# Patient Record
Sex: FEMALE | Race: OTHER
[De-identification: names, ages, dates, MRNs, and addresses within clinical notes are randomized per-mention and may not be internally consistent; named-entity substitution may affect disease eponyms.]

---

## 2018-01-18 ENCOUNTER — HOSPITAL ENCOUNTER (EMERGENCY)
Dept: HOSPITAL 74 - JER | Age: 63
Discharge: HOME | End: 2018-01-18
Payer: COMMERCIAL

## 2018-01-18 VITALS — DIASTOLIC BLOOD PRESSURE: 83 MMHG | TEMPERATURE: 98.5 F | SYSTOLIC BLOOD PRESSURE: 141 MMHG | HEART RATE: 63 BPM

## 2018-01-18 VITALS — BODY MASS INDEX: 25.8 KG/M2

## 2018-01-18 DIAGNOSIS — R51: ICD-10-CM

## 2018-01-18 DIAGNOSIS — I10: Primary | ICD-10-CM

## 2018-01-18 PROCEDURE — 3E0233Z INTRODUCTION OF ANTI-INFLAMMATORY INTO MUSCLE, PERCUTANEOUS APPROACH: ICD-10-PCS

## 2018-01-18 NOTE — PDOC
History of Present Illness





- General


Chief Complaint: Blood Pressure Problem


Stated Complaint: BLOOD PRESSURE


Time Seen by Provider: 01/18/18 14:28


History Source: Patient


Exam Limitations: No Limitations





- History of Present Illness


Initial Comments: 





CHIEF COMPLAINT:  63 y/o afebrile female with PMH HTN (was taken off meds 1 

year ago by her doctor as they were no longer needed) c/o high blood pressure 

today. 





HISTORY OF PRESENT ILLNESS:  The patient states she's been very stressed lately 

as her mom is in the hospital.  She states for the past 5 days she's had a 

consistent pain in the back of her head on left side.  She states this morning 

she took her BP at home and it was 160/105 so she came here.  She denies fever, 

changes in vision/hearing, neck pain, n/v/d, cough, Cp, SOB, abd pain and all 

other symptoms.  Patient has not taken anything for the pain in her head. 





Vital signs on arrival are within normal limits. 





REVIEW OF SYSTEMS:


GENERAL/CONSTITUTIONAL: No fever/chills. No weakness. No weight change.  +high 

BP at home today. 


HEAD, EYES, EARS, NOSE AND THROAT: No change in vision. No ear pain or 

discharge. No sore throat.


CARDIOVASCULAR: No chest pain or shortness of breath.


RESPIRATORY: No cough, wheezing, or hemoptysis.


GASTROINTESTINAL: No abd pain, nausea, vomiting, diarrhea. 


GENITOURINARY: No dysuria, frequency, or change in urination.


MUSCULOSKELETAL: No joint or muscle swelling or pain. No neck or back pain.


SKIN: No rash or easy bruising.


NEUROLOGIC: +left posterior head pain. No vertigo, loss of consciousness, or 

loss of sensation.








PHYSICAL EXAM:


GENERAL: The patient is awake, alert, and fully oriented, in no acute distress.

  She is well appearing, ambulatory and pleasant


HEAD: Normal with no signs of trauma.  Reproducible pain with palpation of left 

posterior scalp.


ENT: Pupils equal, round and reactive to light, extraocular movements intact, 

sclera anicteric, conjunctiva clear. Neck supple.


LUNGS: Clear to auscultation bilaterally. Normal excursion. No respiratory 

distress or use of accessory muscles.


CV: RRR, S1/S2, no MRG. Cap refill < 2 sec.


ABDOMEN: Soft, non-distended, non-tender even to deep palpation, no 

hepatomegaly or splenomegaly, no masses.


EXTREMITIES: Normal range of motion, no edema.


NEUROLOGICAL: Normal speech, normal gait. CN II-XII grossly intact.


SKIN: Warm, dry, normal turgor, no rashes or lesions noted.

















Past History





- Past Medical History


Allergies/Adverse Reactions: 


 Allergies











Allergy/AdvReac Type Severity Reaction Status Date / Time


 


No Known Allergies Allergy   Verified 01/18/18 13:59











Home Medications: 


Ambulatory Orders





Calcium Carbonate/Vitamin D3 [Calcium 600 + D3 Softgel] 1 each PO BID 01/18/18 


Vitamin A 50,000 unit PO WEEKLY 01/18/18 











- Suicide/Smoking/Psychosocial Hx


Smoking History: Never smoked





*Physical Exam





- Vital Signs


 Last Vital Signs











Temp Pulse Resp BP Pulse Ox


 


 98.5 F   63   19   141/83   100 


 


 01/18/18 13:59  01/18/18 13:59  01/18/18 13:59  01/18/18 13:59  01/18/18 13:59














Medical Decision Making





- Medical Decision Making





A/P:  63 y/o female with 5 days of musculoskeletal head pain that most likely 

caused her blood pressure to be elevated this morning.  Patient's BP was normal 

in the ER.  Plan is as follows:





1. IM toradol





  Will discharge to home with instructions to take NSAIDs for pain with food if 

needed and return to the ER with any worsening or concerning symptoms.








The patient verbalizes understanding of all instructions, has no further 

questions and is awaiting discharge.














*DC/Admit/Observation/Transfer


Diagnosis at time of Disposition: 


 Musculoskeletal pain








- Discharge Dispostion


Disposition: HOME


Condition at time of disposition: Good





- Referrals





- Patient Instructions


Printed Discharge Instructions:  [facility] for Musculoskeletal Pain


Additional Instructions: 


Discharge Instructions:


-Take 600mg of Ibuprofen every 6 hours with food for pain/headache.


-Return to the ER with any worsening or concerning symptoms. 








Instrucciones de descarga:


[place] 600 mg de ibuprofeno cada 6 horas con alimentos para el dolor / dolor de 

jerri.


-Volver a la luis de emergencias con cualquier empeoramiento o sntomas.





Print Language: [place]





- Post Discharge Activity

## 2024-10-03 ENCOUNTER — HOSPITAL ENCOUNTER (OUTPATIENT)
Dept: HOSPITAL 74 - JASU-ENDO | Age: 69
Discharge: HOME | End: 2024-10-03
Attending: INTERNAL MEDICINE
Payer: MEDICARE

## 2024-10-03 VITALS — DIASTOLIC BLOOD PRESSURE: 71 MMHG | SYSTOLIC BLOOD PRESSURE: 118 MMHG

## 2024-10-03 VITALS — RESPIRATION RATE: 18 BRPM | HEART RATE: 55 BPM

## 2024-10-03 VITALS — BODY MASS INDEX: 26.9 KG/M2

## 2024-10-03 VITALS — TEMPERATURE: 97.9 F

## 2024-10-03 DIAGNOSIS — K64.8: ICD-10-CM

## 2024-10-03 DIAGNOSIS — Z12.11: Primary | ICD-10-CM

## 2024-10-03 PROCEDURE — 0DJD8ZZ INSPECTION OF LOWER INTESTINAL TRACT, VIA NATURAL OR ARTIFICIAL OPENING ENDOSCOPIC: ICD-10-PCS | Performed by: INTERNAL MEDICINE
